# Patient Record
Sex: MALE | Race: WHITE | Employment: FULL TIME | URBAN - METROPOLITAN AREA
[De-identification: names, ages, dates, MRNs, and addresses within clinical notes are randomized per-mention and may not be internally consistent; named-entity substitution may affect disease eponyms.]

---

## 2018-06-21 ENCOUNTER — APPOINTMENT (OUTPATIENT)
Dept: VASCULAR SURGERY | Facility: CLINIC | Age: 43
End: 2018-06-21

## 2018-06-21 PROCEDURE — 99207 ZZC VEINSOLUTIONS FREE SCREENING: CPT | Performed by: SURGERY

## 2018-07-09 ENCOUNTER — OFFICE VISIT (OUTPATIENT)
Dept: VASCULAR SURGERY | Facility: CLINIC | Age: 43
End: 2018-07-09

## 2018-07-09 ENCOUNTER — APPOINTMENT (OUTPATIENT)
Dept: VASCULAR SURGERY | Facility: CLINIC | Age: 43
End: 2018-07-09

## 2018-07-09 DIAGNOSIS — Z53.9 ERRONEOUS ENCOUNTER--DISREGARD: Primary | ICD-10-CM

## 2018-07-09 PROCEDURE — 93971 EXTREMITY STUDY: CPT | Performed by: SURGERY

## 2018-07-09 PROCEDURE — 99203 OFFICE O/P NEW LOW 30 MIN: CPT | Performed by: SURGERY

## 2018-07-09 NOTE — MR AVS SNAPSHOT
"              After Visit Summary   7/9/2018    Sanford Alejandra    MRN: [id number]           Patient Information     Date Of Birth          1975        Visit Information        Provider Department      7/9/2018 11:30 AM Reuben Serrato MD [facility]      Today's Diagnoses     ERRONEOUS ENCOUNTER--DISREGARD    -  1       Follow-ups after your visit        Your next 10 appointments already scheduled     Sep 06, 2018  2:30 PM CDT   Phlebectomy with Reuben Serrato MD, [facility] VE[facility] NURSE, [facility] VE[facility] PROCEDURE ROOM 1   [facility] ([facility])    [id number] [facility] [id number] [phone]            Sep 10, 2018  3:45 PM CDT   Rewrap 48 Hour with Reuben Serrato MD   [facility] ( [facility])    [id number] [facility] [id number] [phone]              Who to contact     If you have questions or need follow up information about today's clinic visit or your schedule please contact [facility] directly at [phone].  Normal or non-critical lab and imaging results will be communicated to you by MyChart, letter or phone within 4 business days after the clinic has received the results. If you do not hear from us within 7 days, please contact the clinic through [employer] or phone. If you have a critical or abnormal lab result, we will notify you by phone as soon as possible.  Submit refill requests through [employer] or call your pharmacy and they will forward the refill request to us. Please allow 3 business days for your refill to be completed.          Additional Information About Your Visit        MyChart Information     [employer] lets you send messages to your doctor, view your test results, renew your prescriptions, schedule appointments and more. To sign up, go to [email or web address] . Click on \"Log in\" on " "the left side of the screen, which will take you to the Welcome page. Then click on \"Sign up Now\" on the right side of the page.     You will be asked to enter the access code listed below, as well as some personal information. Please follow the directions to create your username and password.     Your access code is: [email or web address]  Expires: 10/28/2018 11:10 AM     Your access code will  in 90 days. If you need help or a new code, please call your [facility] clinic or [phone].        Care EveryWhere ID     This is your Care EveryWhere ID. This could be used by other organizations to access your [facility] medical records  [id number]         Blood Pressure from Last 3 Encounters:   No data found for BP    Weight from Last 3 Encounters:   No data found for Wt              Today, you had the following     No orders found for display       Primary Care Provider    None Specified       No primary provider on file.        Equal Access to Services     [facility]: Hadii garcía Winter, waaxda lususan, qaybta kaalmada yury, jeremias healy . So [facility] [phone].    ATENCIÓN: Si habla español, tiene a sheppard disposición servicios gratuitos de asistencia lingüística. Renettaame al [phone].    We comply with applicable federal civil rights laws and [place] laws. We do not discriminate on the basis of race, color, national origin, age, disability, sex, sexual orientation, or gender identity.            Thank you!     Thank you for choosing [facility]  for your care. Our goal is always to provide you with excellent care. Hearing back from our patients is one way we can continue to improve our services. Please take a few minutes to complete the written survey that you may receive in the mail after your visit with us. Thank you!             Your Updated Medication List - Protect others around you: Learn how to safely use, store and throw away your medicines at " www.disposemymeds.org.      Notice  As of 7/9/2018 11:59 PM    You have not been prescribed any medications.

## 2018-07-09 NOTE — PROGRESS NOTES
[facility] Consultation    Sanford Alejandra is a 43-year-old gentleman who presents with complaints of right leg pain and varicose veins.  The pain is located primarily in his right posterior calf and is described as an aching, tiredness and heaviness.  He feels that he developed the visible veins after severe ankle sprain.  He has no history of deep vein thrombosis or superficial thrombophlebitis.  The discomfort does improve with use of compression hose which he has worn over the last 3 weeks.    His past medical history is essentially negative.    Past surgical history: Negative    Medicines: None    Allergies: NKA    Social history: He is non-smoker and has 1-2 drinks of alcohol weekly.    12 point review of systems was completed and was reviewed.  It is significant for sore throat, sinus infections but is otherwise as noted in the history of present illness and past medical history.    Physical exam  General: Pleasant, fit appearing gentleman in no acute distress.  He 6 feet 2 inches tall weighs 188 pounds  Blood pressure is 149/86 pulse 84 and regular  HEENT: Normocephalic, atraumatic.  EOMI.  External ears and nose are normal.  Respiratory: Normal respiratory effort  Cardiovascular: Pulse is regular  Psychiatric: Judgment, insight, mood and affect are normal  Musculoskeletal: Station are normal.  The joints of his fingers and toes without deformity.  There is no cyanosis of his nailbeds.  Neurologic: Grossly normal  Extremities: There is a 4-5 mm varicosity on the posterior aspect of the right calf.  There are scattered telangiectasias about the anterolateral ankle extending cephalad along the lateral pretibial area.  There are no venous stasis changes and there is no edema.  In the left lower extremity, he has a 4-6 mm varicosity in the posterior mid left calf.  There are scattered telangiectasias about the left anterolateral ankle extending cephalad along the lateral pretibial area.  There is no edema and  there are no venous stasis changes.  He has 4+ dorsalis pedis pulses bilaterally and 3+ posterior tibial pulses bilaterally.    Duplex ultrasound of his right lower extremity veins reveals no evidence of deep vein thrombosis.  His right common femoral vein is incompetent but the remaining deep vein valves are competent.  His right great saphenous vein, small saphenous vein, Vein of Giacomini an anterior accessory saphenous veins are competent.  There is a 2.1 mm diameter incompetent vein branch coursing from the proximal calf to the mid calf posterolaterally.  There is a varicosity on the mid posterior left calf.    There are no incompetent perforators appreciated.    Impression  There does not appear to be any significant axial incompetence underlying his symptoms.  I discussed the option of continued conservative management with the very small risk of superficial thrombophlebitis, bleeding or worsening of the varicose vein.    He will continue to wear compression but may wish to have the veins of his posterior calves treated bilaterally.  As there is no significant underlying axial incompetence, treatment would be considered non-medically necessary.  He may also wish to have the telangiectasias of his legs treated.    Details of treatment including risks of bleeding, infection, nerve injury, scarring and hyperpigmentation from the phlebectomy as well as allergic reaction, ulceration, deep vein thrombosis, hyperpigmentation and the need for multiple sessions of sclerotherapy were discussed.  He appears to understand.  Photos were taken.    MARK Serrato MD

## 2018-09-06 ENCOUNTER — OFFICE VISIT (OUTPATIENT)
Dept: VASCULAR SURGERY | Facility: CLINIC | Age: 43
End: 2018-09-06

## 2018-09-06 DIAGNOSIS — Z53.9 ERRONEOUS ENCOUNTER--DISREGARD: Primary | ICD-10-CM

## 2018-09-06 PROCEDURE — 37765 STAB PHLEB VEINS XTR 10-20: CPT | Performed by: SURGERY

## 2018-09-06 PROCEDURE — S9999 SALES TAX: HCPCS | Performed by: SURGERY

## 2018-09-06 PROCEDURE — 37799 UNLISTED PX VASCULAR SURGERY: CPT | Performed by: SURGERY

## 2018-09-06 NOTE — PROGRESS NOTES
[facility] Operative Report    Preoperative diagnosis  Asymptomatic bilateral lower extremity varicose veins    Postoperative diagnosis  Same    Procedure  Bilateral lower extremity cosmetic phlebectomies (3 incisions left leg, 5 right leg    Surgeon  MARK Serrato MD    First assistant  Michelle Trinidad CST/RAE Terrell RN, CST monitored blood pressure, pulse and pulse oximetry throughout the procedure    Anesthesia  1% lidocaine with bicarbonate and epinephrine combined with saline solution    Operative description  Sanford Alejandra presents with asymptomatic bilateral calf veins of cosmetic concern only.  We discussed options of continued conservative management with low risks of superficial phlebitis and enlargement of these tributaries.  He wishes to have them removed for cosmetic purposes.  Details of procedure including risks of bleeding and infection were discussed we also discussed the small risk of nerve injury with some numbness.  He appeared to understand and wish to proceed.  Informed consent was obtained.    I had Sanford stand and marked varicosities coursing along the posterior calves bilaterally.  We then proceeded operating room, had him lie prone on our operating table, then prepped and draped both calves sterilely.  We took a timeout to confirm the appropriate operative site and procedure: Multiple stab phlebectomies bilateral lower extremities.    Infiltrated the tissues surrounding each of the marked varicosities with tumescent anesthetic composed of lidocaine with epinephrine and bicarbonate combined with saline solution.  We allowed the block take effect, made stab wounds with an ophthalmic blade beside each marked varicosities, retrieved them with vein  hooks and then avulsed them with mosquito clamps.  Hemostasis was secured pressure.  The larger varicosities were on the right leg.    We cleaned leg with saline solution and applied small gauze and Tegaderm dressings to the puncture sites.   We then dressed the leg with ABD pads and an Ace bandage from the toes to below the knees.  We observe the patient for 30 minutes in recovery to ensure excellent hemostasis and allowed him to walk to his car.  He tolerated procedure well without evidence of complications.    Used a total of 50 mL of tumescent anesthetic to perform 5 phlebectomies in the right leg and 3 on the left leg.  He will return in 5-4 days for wound check.    MARK Serrato MD

## 2018-09-10 ENCOUNTER — APPOINTMENT (OUTPATIENT)
Dept: VASCULAR SURGERY | Facility: CLINIC | Age: 43
End: 2018-09-10
Payer: COMMERCIAL

## 2018-09-10 PROCEDURE — 99207 ZZC VEINSOLUTIONS 48 HOUR: CPT | Performed by: SURGERY

## 2018-10-08 ENCOUNTER — APPOINTMENT (OUTPATIENT)
Dept: VASCULAR SURGERY | Facility: CLINIC | Age: 43
End: 2018-10-08
Payer: COMMERCIAL

## 2018-10-08 PROCEDURE — 99207 ZZC VEINSOLUTIONS POST OPERATIVE VISIT: CPT | Performed by: SURGERY
